# Patient Record
Sex: MALE | Race: WHITE | NOT HISPANIC OR LATINO
[De-identification: names, ages, dates, MRNs, and addresses within clinical notes are randomized per-mention and may not be internally consistent; named-entity substitution may affect disease eponyms.]

---

## 2017-02-04 NOTE — Clinical Note
[facility] Just Milo Berman is off steroids and seems to be doing well. Will monitor him off for now and hopefully he doesn't have any recurrence.  Take care,  Vanessa Steel, DO  [facility]  11/10/2021
immune

## 2018-02-14 ENCOUNTER — HOSPITAL (OUTPATIENT)
Dept: OTHER | Age: 53
End: 2018-02-14
Attending: FAMILY MEDICINE

## 2020-07-07 ENCOUNTER — OFFICE VISIT (OUTPATIENT)
Dept: RHEUMATOLOGY | Facility: CLINIC | Age: 55
End: 2020-07-07
Payer: COMMERCIAL

## 2020-07-07 ENCOUNTER — HOSPITAL ENCOUNTER (OUTPATIENT)
Dept: GENERAL RADIOLOGY | Facility: HOSPITAL | Age: 55
Discharge: HOME | End: 2020-07-07
Attending: INTERNAL MEDICINE
Payer: COMMERCIAL

## 2020-07-07 ENCOUNTER — LAB ENCOUNTER (OUTPATIENT)
Dept: LAB | Facility: HOSPITAL | Age: 55
End: 2020-07-07
Attending: INTERNAL MEDICINE
Payer: COMMERCIAL

## 2020-07-07 VITALS
BODY MASS INDEX: 26.18 KG/M2 | SYSTOLIC BLOOD PRESSURE: 114 MMHG | RESPIRATION RATE: 18 BRPM | TEMPERATURE: 98 F | HEIGHT: 71 IN | DIASTOLIC BLOOD PRESSURE: 78 MMHG | HEART RATE: 78 BPM | WEIGHT: 187 LBS

## 2020-07-07 DIAGNOSIS — M53.3 SI (SACROILIAC) PAIN: ICD-10-CM

## 2020-07-07 DIAGNOSIS — G89.29 CHRONIC LEFT SHOULDER PAIN: ICD-10-CM

## 2020-07-07 DIAGNOSIS — R79.82 ELEVATED C-REACTIVE PROTEIN (CRP): ICD-10-CM

## 2020-07-07 DIAGNOSIS — M25.512 CHRONIC LEFT SHOULDER PAIN: ICD-10-CM

## 2020-07-07 DIAGNOSIS — M19.90 INFLAMMATORY ARTHRITIS: Primary | ICD-10-CM

## 2020-07-07 DIAGNOSIS — M79.10 MYALGIA: ICD-10-CM

## 2020-07-07 DIAGNOSIS — M25.551 BILATERAL HIP PAIN: ICD-10-CM

## 2020-07-07 DIAGNOSIS — M19.90 INFLAMMATORY ARTHRITIS: ICD-10-CM

## 2020-07-07 DIAGNOSIS — M25.552 BILATERAL HIP PAIN: ICD-10-CM

## 2020-07-07 LAB
CRP SERPL-MCNC: 1.64 MG/DL (ref ?–0.3)
SED RATE-ML: 6 MM/HR (ref 0–12)
URATE SERPL-MCNC: 6 MG/DL (ref 3.5–7.2)

## 2020-07-07 PROCEDURE — 72202 X-RAY EXAM SI JOINTS 3/> VWS: CPT | Performed by: INTERNAL MEDICINE

## 2020-07-07 PROCEDURE — 36415 COLL VENOUS BLD VENIPUNCTURE: CPT

## 2020-07-07 PROCEDURE — 86334 IMMUNOFIX E-PHORESIS SERUM: CPT

## 2020-07-07 PROCEDURE — 86235 NUCLEAR ANTIGEN ANTIBODY: CPT

## 2020-07-07 PROCEDURE — 86140 C-REACTIVE PROTEIN: CPT

## 2020-07-07 PROCEDURE — 83516 IMMUNOASSAY NONANTIBODY: CPT

## 2020-07-07 PROCEDURE — 84165 PROTEIN E-PHORESIS SERUM: CPT

## 2020-07-07 PROCEDURE — 83883 ASSAY NEPHELOMETRY NOT SPEC: CPT

## 2020-07-07 PROCEDURE — 85652 RBC SED RATE AUTOMATED: CPT

## 2020-07-07 PROCEDURE — 84550 ASSAY OF BLOOD/URIC ACID: CPT

## 2020-07-07 PROCEDURE — 99204 OFFICE O/P NEW MOD 45 MIN: CPT | Performed by: INTERNAL MEDICINE

## 2020-07-07 NOTE — PROGRESS NOTES
?  RHEUMATOLOGY NEW PATIENT   Date of visit: 7/7/2020  ? Patient presents with:  Establish Care: Here for second opinion, saw Dr. Mariann Ochoa in May, started with hip pain and stiffness in March.  Had bllod work that [employer], some imrovement in pain on patient, >50% of that time spent discussing potential etiology to symptoms, treatment options and coordinating care.       ?  Plan:  Diagnoses and all orders for this visit:    Inflammatory arthritis  -     URIC ACID, SERUM; Future  -     SED RATE, Ivan Phelps dose pack. States within the first 3 days, felt like he was back to his normal. When on the last tablet, felt like the stiffness was returning. Had MRI of hip and lumbar spine.    Followed back with orthopedics and repeat blood test and xrays of hands wer photosensitive rash, Raynaud's phenomenon, prior hematologic abnormality, prior renal or liver disease, or history of seizures. Denies hx of pericarditis or pleuritis. No history of prior blood clot in the legs or lungs, strokes or ischemic phenomenon. History:  Social History    Tobacco Use      Smoking status: Never Smoker      Smokeless tobacco: Never Used    Alcohol use:  Yes      Alcohol/week: 15.0 standard drinks      Types: 15 Glasses of wine per week    Drug use: No    Medications:  Cetirizine HCl lb (84.8 kg)   Height: 71\" Body mass index is 26.08 kg/m². Body surface area is 2.05 meters squared. Pain Score: 4 - (Moderate)       Physical Exam   Constitutional: He is oriented to person, place, and time. He appears well-developed and well-nourished. Pain in right wrist  TECHNIQUE: 8 views. COMPARISON STUDY:  None. FINDINGS:   BONES:No acute osseous abnormality . Diffuse osteopenia. JOINTS:Normal alignment. No joint space narrowing. No erosions. No osteophyte formation.   SOFT TISSUE:No soft tissue s is not clearly delineated on the current  examination.  However, a small cystic structure is seen superior to the anterior and superior  superior aspect of the labrum which is homogeneously high in T2 and low on T1-weighted images  measuring 0.6 x 0.4 x 0.3 05.22.2020     MRI SPINE LUMBAR (CPT=72148)  CLINICAL INDICATION: Spondylosis, unspecified  TECHNIQUE: Multiplanar and multisequence MR imaging was performed through the lumbar spine using the  standard MR protocol.   COMPARISON: MRI of the lumbar spine manuel 04/15/2020    GFR 80 08/03/2016    CA 9.3 04/15/2020    ALKPHO 74 04/15/2020    AST 27 04/15/2020    ALT 56 04/15/2020    BILT 0.55 04/15/2020    TP 8.0 04/15/2020    ALB 4.3 04/15/2020    AGRATIO 2.0 12/19/2015     04/15/2020    K 4.40 04/15/2020

## 2020-07-08 ENCOUNTER — TELEPHONE (OUTPATIENT)
Dept: RHEUMATOLOGY | Facility: CLINIC | Age: 55
End: 2020-07-08

## 2020-07-08 DIAGNOSIS — R79.82 ELEVATED C-REACTIVE PROTEIN (CRP): ICD-10-CM

## 2020-07-08 DIAGNOSIS — M35.3 PMR (POLYMYALGIA RHEUMATICA) (HCC): Primary | ICD-10-CM

## 2020-07-08 LAB
KAPPA FREE LIGHT CHAIN: 1.52 MG/DL (ref 0.33–1.94)
KAPPA/LAMBDA FLC RATIO: 1.13 (ref 0.26–1.65)
LAMBDA FREE LIGHT CHAIN: 1.35 MG/DL (ref 0.57–2.63)

## 2020-07-08 RX ORDER — PREDNISONE 1 MG/1
TABLET ORAL
Qty: 90 TABLET | Refills: 0 | Status: SHIPPED | OUTPATIENT
Start: 2020-07-08 | End: 2020-08-27

## 2020-07-08 NOTE — TELEPHONE ENCOUNTER
Called pt and discussed elevated CRP. Symptoms could be related to PMR. Will move forward with prolonged steroid taper as discussed at visit yesterday. Has plans for colonoscopy next week so will start prednisone thereafter.   Labs in another one month and

## 2020-07-13 LAB
ALBUMIN SERPL ELPH-MCNC: 4.7 G/DL (ref 3.75–5.21)
ALBUMIN/GLOB SERPL: 1.56 {RATIO} (ref 1–2)
ALPHA1 GLOB SERPL ELPH-MCNC: 0.36 G/DL (ref 0.19–0.46)
ALPHA2 GLOB SERPL ELPH-MCNC: 0.71 G/DL (ref 0.48–1.05)
B-GLOBULIN SERPL ELPH-MCNC: 0.82 G/DL (ref 0.68–1.23)
GAMMA GLOB SERPL ELPH-MCNC: 1.12 G/DL (ref 0.62–1.7)
M PROTEIN MFR SERPL ELPH: 7.7 G/DL (ref 6.4–8.2)

## 2020-07-14 ENCOUNTER — TELEPHONE (OUTPATIENT)
Dept: RHEUMATOLOGY | Facility: CLINIC | Age: 55
End: 2020-07-14

## 2020-07-21 LAB
EJ (GLYCYL - TRNA SYNTHETASE) ANTIBODY: NEGATIVE
FIBRILLARIN (U3 RNP) AB, IGG: NEGATIVE
JO-1 (HISTIDY1-TRNA SYNTHETASE) AB, IGG: 0 AU/ML
KU ANTIBODY: NEGATIVE
MDA5 (CADM-140) ANTIBODY: NEGATIVE
MI-2 (NUCLEAR HELICASE PROTEIN) ANTIBODY: NEGATIVE
NXP-2 (NUCLEAR MATRIX PROTEIN-2) AB: NEGATIVE
OJ (ISOLEUCYL-TRNA SYNTHETASE) ANTIBODY: NEGATIVE
P155/140 (TIF-1 GAMMA) ANTIBODY: NEGATIVE
PL-12 (ALANYL-TRNA SYNTHETASE) ANTIBODY: NEGATIVE
PL-7 (THREONYL-TRNA SYNTHETASE) ANTIBODY: NEGATIVE
PM_SCL 100 ANTIBODY, IGG: NEGATIVE
RIBONUCLEIC PROTEIN (U1) (ENA) AB, IGG: 10 AU/ML
SAE1 (SUMO ACTIVATING ENZYME) ANTIBODY: NEGATIVE
SRP (SINGLE RECOGNITION PARTICLE) AB: NEGATIVE
SSA 52 (RO) (ENA) ANTIBODY, IGG: 2 AU/ML
SSA 60 (RO) (ENA) ANTIBODY, IGG: 1 AU/ML
TIF1-GAMMA ANTIBODY: NEGATIVE

## 2020-08-26 ENCOUNTER — PATIENT MESSAGE (OUTPATIENT)
Dept: RHEUMATOLOGY | Facility: CLINIC | Age: 55
End: 2020-08-26

## 2020-08-26 DIAGNOSIS — M35.3 PMR (POLYMYALGIA RHEUMATICA) (HCC): ICD-10-CM

## 2020-08-26 DIAGNOSIS — R79.82 ELEVATED C-REACTIVE PROTEIN (CRP): ICD-10-CM

## 2020-08-27 RX ORDER — PREDNISONE 2.5 MG
2.5 TABLET ORAL DAILY
Qty: 30 TABLET | Refills: 0 | Status: SHIPPED | OUTPATIENT
Start: 2020-08-27 | End: 2020-09-14

## 2020-08-27 NOTE — TELEPHONE ENCOUNTER
From: Frank Woodson  To: Mack Jones DO  Sent: 8/26/2020 10:36 PM CDT  Subject: Prescription Yanick Lat Dr. Eli Castillo, I will run out or prednisone on Friday. I am feeling pretty good, only a hint of stiffness now and then.  Should I get a refill and

## 2020-09-14 RX ORDER — PREDNISONE 1 MG/1
2 TABLET ORAL 2 TIMES DAILY
Qty: 360 TABLET | Refills: 0 | Status: SHIPPED | OUTPATIENT
Start: 2020-09-14 | End: 2020-11-23

## 2020-10-01 ENCOUNTER — PATIENT MESSAGE (OUTPATIENT)
Dept: RHEUMATOLOGY | Facility: CLINIC | Age: 55
End: 2020-10-01

## 2020-10-01 DIAGNOSIS — M19.90 INFLAMMATORY ARTHRITIS: ICD-10-CM

## 2020-10-01 DIAGNOSIS — M35.3 PMR (POLYMYALGIA RHEUMATICA) (HCC): Primary | ICD-10-CM

## 2020-10-01 DIAGNOSIS — R79.82 ELEVATED C-REACTIVE PROTEIN (CRP): ICD-10-CM

## 2020-10-06 ENCOUNTER — OFFICE VISIT (OUTPATIENT)
Dept: RHEUMATOLOGY | Facility: CLINIC | Age: 55
End: 2020-10-06
Payer: COMMERCIAL

## 2020-10-06 VITALS
HEART RATE: 78 BPM | WEIGHT: 186 LBS | SYSTOLIC BLOOD PRESSURE: 136 MMHG | TEMPERATURE: 97 F | RESPIRATION RATE: 16 BRPM | HEIGHT: 71 IN | BODY MASS INDEX: 26.04 KG/M2 | DIASTOLIC BLOOD PRESSURE: 84 MMHG

## 2020-10-06 DIAGNOSIS — M25.551 BILATERAL HIP PAIN: ICD-10-CM

## 2020-10-06 DIAGNOSIS — M25.552 BILATERAL HIP PAIN: ICD-10-CM

## 2020-10-06 DIAGNOSIS — R79.82 ELEVATED C-REACTIVE PROTEIN (CRP): ICD-10-CM

## 2020-10-06 DIAGNOSIS — Z79.52 CURRENT CHRONIC USE OF SYSTEMIC STEROIDS: ICD-10-CM

## 2020-10-06 DIAGNOSIS — M35.3 PMR (POLYMYALGIA RHEUMATICA) (HCC): Primary | ICD-10-CM

## 2020-10-06 DIAGNOSIS — M19.90 INFLAMMATORY ARTHRITIS: ICD-10-CM

## 2020-10-06 PROCEDURE — 3075F SYST BP GE 130 - 139MM HG: CPT | Performed by: INTERNAL MEDICINE

## 2020-10-06 PROCEDURE — 3079F DIAST BP 80-89 MM HG: CPT | Performed by: INTERNAL MEDICINE

## 2020-10-06 PROCEDURE — 99214 OFFICE O/P EST MOD 30 MIN: CPT | Performed by: INTERNAL MEDICINE

## 2020-10-06 PROCEDURE — 3008F BODY MASS INDEX DOCD: CPT | Performed by: INTERNAL MEDICINE

## 2020-10-06 NOTE — PROGRESS NOTES
?  RHEUMATOLOGY FOLLOW UP   Date of visit: 10/06/2020  ? Patient presents with: Follow - Up: est pt, Taking 4mg prednisone daily, but having feelings of stiffness to joints. Pain to neck, hips, knees and wrists. Walks 5miles every day.      ?  ASSESSMEN above instructions. No further questions at this time. Spent 25 min face to face with patient, >50% of that time spent discussing potential etiology to symptoms, treatment options and coordinating care.       ?  Plan:  Diagnoses and all orders for this vis discomfort on the left. Felt this worse more at night. His daughter is  PT and did some exercises for bursitis as well as for the shoulder with some improvement. Then developed some low neck pain as well as low back pain, not severe.   Was evaluated by Shelby Martínez painful to the touch. States popped the lesions himself with \"blood and water\" came out. Has since tried to adjust his diet and eating , takes tumeric, fish oil, calcium and vitamin d, glucosamine supplementations.      Denies any infection or f CARDIAC CALCIUM SCORE  2004    UFCT zero   • CARDIAC CALCIUM SCORE  2018    79   • COLONOSCOPY  7/14/20= Diverticulosis    Repeat 2025   • COLONOSCOPY & POLYPECTOMY  1/30/15= Polyp (TA)    Repeat 2020   • COLONOSCOPY, POSSIBLE BIOPSY, POSSIBLE POLYPECTOMY nausea and vomiting. Genitourinary: Negative for dysuria, frequency and urgency. Musculoskeletal: Positive for back pain, joint pain and myalgias. Negative for neck pain. Skin: Negative for rash.    Neurological: Negative for dizziness, seizures, weak with full ROM. Bilateral knees without medial joint line tenderness, no crepitus, no effusion. + noted momentary discomfort when first getting up from seated position   Lymphadenopathy:     He has no cervical adenopathy.    Neurological: He is alert and o images and thin cut proton density fat-saturated images  through the right hip only were also utilized. Contrast was not injected. Imaging was performed on a  1.5 [place] magnet.   FINDINGS:  OSSEOUS STRUCTURES:   The visualized osseous structures of the bony areas are appreciated within the peritoneal cavity and there is no  free fluid. There is no inguinal adenopathy or gross ventral wall defect and the subcutaneous  tissues are within normal limits.  No additional focal soft tissue abnormalities are seen asso obvious fractures or destructive lesions seen. COMPARISON STUDIES:  Old imported MRI 1/22/2013  =====  IMPRESSION:    Degenerative disc disease as detailed above.     Labs:  Lab Results   Component Value Date    WBC 6.05 04/15/2020    RBC 5.12 04/15/2020

## 2020-10-06 NOTE — PATIENT INSTRUCTIONS
-- okay to increase prednisone to 5mg daily for next month (take full dose in am)  -- continue calcium/vitamin d supplementation   -- repeat labs in one month   -- okay to get both flu and shingles vaccines  -- follow up in 4 months or sooner as needed  --

## 2020-11-20 ENCOUNTER — PATIENT MESSAGE (OUTPATIENT)
Dept: RHEUMATOLOGY | Facility: CLINIC | Age: 55
End: 2020-11-20

## 2020-11-20 DIAGNOSIS — R79.82 ELEVATED C-REACTIVE PROTEIN (CRP): ICD-10-CM

## 2020-11-20 DIAGNOSIS — M35.3 PMR (POLYMYALGIA RHEUMATICA) (HCC): ICD-10-CM

## 2020-11-23 RX ORDER — PREDNISONE 1 MG/1
4 TABLET ORAL
Qty: 360 TABLET | Refills: 0 | Status: SHIPPED | OUTPATIENT
Start: 2020-11-23 | End: 2021-02-21

## 2021-02-03 ENCOUNTER — OFFICE VISIT (OUTPATIENT)
Dept: RHEUMATOLOGY | Facility: CLINIC | Age: 56
End: 2021-02-03
Payer: COMMERCIAL

## 2021-02-03 VITALS
SYSTOLIC BLOOD PRESSURE: 124 MMHG | TEMPERATURE: 97 F | RESPIRATION RATE: 16 BRPM | BODY MASS INDEX: 26.6 KG/M2 | HEART RATE: 78 BPM | HEIGHT: 71 IN | DIASTOLIC BLOOD PRESSURE: 76 MMHG | WEIGHT: 190 LBS

## 2021-02-03 DIAGNOSIS — M25.532 PAIN IN BOTH WRISTS: ICD-10-CM

## 2021-02-03 DIAGNOSIS — M79.642 BILATERAL HAND PAIN: ICD-10-CM

## 2021-02-03 DIAGNOSIS — M35.3 PMR (POLYMYALGIA RHEUMATICA) (HCC): Primary | ICD-10-CM

## 2021-02-03 DIAGNOSIS — M25.531 PAIN IN BOTH WRISTS: ICD-10-CM

## 2021-02-03 DIAGNOSIS — M79.641 BILATERAL HAND PAIN: ICD-10-CM

## 2021-02-03 DIAGNOSIS — Z79.52 CURRENT CHRONIC USE OF SYSTEMIC STEROIDS: ICD-10-CM

## 2021-02-03 PROCEDURE — 3078F DIAST BP <80 MM HG: CPT | Performed by: INTERNAL MEDICINE

## 2021-02-03 PROCEDURE — 99214 OFFICE O/P EST MOD 30 MIN: CPT | Performed by: INTERNAL MEDICINE

## 2021-02-03 PROCEDURE — 3008F BODY MASS INDEX DOCD: CPT | Performed by: INTERNAL MEDICINE

## 2021-02-03 PROCEDURE — 3074F SYST BP LT 130 MM HG: CPT | Performed by: INTERNAL MEDICINE

## 2021-02-03 NOTE — PROGRESS NOTES
RHEUMATOLOGY FOLLOW UP   Date of visit: 2/3/2021  ? Patient presents with:  PMR: 4 month f/u. Feeling pretty good. On 3mg of prednisone. Body readjust with each reduction for about a week, but then feels fine. Goal is to get off prednisone completely. decrease  -- I will print labs for the next several months for you  -- okay to follow up in 6 months   -- call/message with any questions/concerns in the meantime    Patient verbalized understanding of above instructions. No further questions at this time. overt side effects to the prednisone. Was able to get flu shot in Oct then got the first shingles vaccine. HPI from initial consultation  referred for rheumatologic evaluation for second opinion regarding joint pain.      States he developed generaliz opening bottles.  Feels swelling in the mornings of the fingers diffusely and ulnar aspect of the right wrist.   + increased knee discomfort after periods of rest.   + hip discomfort still present but not as bad as early May when it was at it's worst.   + b weight loss, easy bruising or bleeding. Denies epistaxis. Denies chronic cough or hemoptysis. Denies obvious blood in urine. Family hx:   Mother and maternal grandfather with osteoarthritis   No known hx of AS, PSA or RA.      Past Medical History: SYSTEMS   ? Review of Systems   Constitutional: Negative for chills, fever, malaise/fatigue and weight loss. HENT: Negative for hearing loss, sinus pain and tinnitus. Eyes: Negative for blurred vision, double vision and pain.    Respiratory: Negative the fingers, no basilar joint tenderness of the 1st CMC bilaterally. - stable  No swelling, tenderness, redness or restriction of motion of the DIPs, PIPs, MCPs, elbows, ankles, or joints of the feet.   Tenderness along ulnar right wrist and restriction in arthropathy. [facility] DATE OF SERVICE: 05.22.2020     MRI HIPS, RIGHT (CPT=73721)  CLINICAL INDICATION: Right lower quadrant pain. COMPARISON STUDY: May 4, 2020 radiograph.   TECHNIQUE: T1 and inversion recovery sequences through the bony pelvis were performed all imaging sequences. There is no evidence of  infiltrative signal abnormality or focal fluid collections to suggest strain, tendinosis or  tenosynovitis. No discrete focal solid or cystic myotendinous lesions are appreciated.   REMAINING SOFT TISSUES AND pain   FINDINGS:     AP, Lateral with flexion/extension views of lumbar spine completed. 5 lumbar vertebral bodies seen. Degenerative disc disease is seen with disc space loss and sclerotic endplate changes and facet  joint hypertrophy noted at L5-S1.

## 2021-02-03 NOTE — PATIENT INSTRUCTIONS
-- decrease prednisone to 2mg daily for the next month  -- repeat labs at the beginning of March, and depending on how you're feeling plus labs, will consider further decrease  -- I will print labs for the next several months for you  -- okay to follow up

## 2021-03-25 ENCOUNTER — PATIENT MESSAGE (OUTPATIENT)
Dept: RHEUMATOLOGY | Facility: CLINIC | Age: 56
End: 2021-03-25

## 2021-03-25 DIAGNOSIS — M35.3 PMR (POLYMYALGIA RHEUMATICA) (HCC): Primary | ICD-10-CM

## 2021-03-26 RX ORDER — PREDNISONE 1 MG/1
2 TABLET ORAL DAILY
Qty: 180 TABLET | Refills: 0 | Status: SHIPPED | OUTPATIENT
Start: 2021-03-26 | End: 2021-04-19 | Stop reason: ALTCHOICE

## 2021-04-21 ENCOUNTER — TELEPHONE (OUTPATIENT)
Dept: RHEUMATOLOGY | Facility: CLINIC | Age: 56
End: 2021-04-21

## 2021-04-21 NOTE — TELEPHONE ENCOUNTER
[facility] test results 4/14/2021 sed rate 2 C-reactive protein 1 both tests are normal.  Normal for [facility] is CRP less than 10. Patient being treated by Dr. Zulema Lenz for PMR. Patient left on answering machine.   Maintain 2 mg/day of prednisone, unless feelin

## 2021-08-11 ENCOUNTER — OFFICE VISIT (OUTPATIENT)
Dept: RHEUMATOLOGY | Facility: CLINIC | Age: 56
End: 2021-08-11
Payer: COMMERCIAL

## 2021-08-11 VITALS
DIASTOLIC BLOOD PRESSURE: 70 MMHG | RESPIRATION RATE: 16 BRPM | HEART RATE: 60 BPM | WEIGHT: 184 LBS | SYSTOLIC BLOOD PRESSURE: 116 MMHG | HEIGHT: 71 IN | TEMPERATURE: 98 F | BODY MASS INDEX: 25.76 KG/M2

## 2021-08-11 DIAGNOSIS — Z87.898 HISTORY OF MULTIPLE PULMONARY NODULES: ICD-10-CM

## 2021-08-11 DIAGNOSIS — R07.9 CHEST PAIN, UNSPECIFIED TYPE: ICD-10-CM

## 2021-08-11 DIAGNOSIS — M35.3 PMR (POLYMYALGIA RHEUMATICA) (HCC): Primary | ICD-10-CM

## 2021-08-11 DIAGNOSIS — Z92.241 HISTORY OF STEROID THERAPY: ICD-10-CM

## 2021-08-11 PROCEDURE — 3078F DIAST BP <80 MM HG: CPT | Performed by: INTERNAL MEDICINE

## 2021-08-11 PROCEDURE — 3008F BODY MASS INDEX DOCD: CPT | Performed by: INTERNAL MEDICINE

## 2021-08-11 PROCEDURE — 99214 OFFICE O/P EST MOD 30 MIN: CPT | Performed by: INTERNAL MEDICINE

## 2021-08-11 PROCEDURE — 3074F SYST BP LT 130 MM HG: CPT | Performed by: INTERNAL MEDICINE

## 2021-08-11 RX ORDER — ASPIRIN 81 MG/1
81 TABLET ORAL DAILY
COMMUNITY

## 2021-08-11 NOTE — PROGRESS NOTES
RHEUMATOLOGY FOLLOW UP   Date of visit: 08/11/2021  ? Patient presents with:  PMR: 6 month f/u. Feeling really good. Decreased prednisone from 2mg to 1mg two weeks ago. Had a heart attack june 12, but recovering ok.      ?  ASSESSMENT, DISCUSSION & PLAN time.    Code selection for this visit was based on time spent (30-39min) on date of service in preparing to see the patient, obtaining and/or reviewing separately obtained history, performing a medically appropriate examination, counseling and educating t for second opinion regarding joint pain. States he developed generalized hip pain, right worse than left in March. Felt discomfort on the lateral hip but all over the anterior hip. States had difficult lifting up the legs.  Has worsened symptoms in the still present but not as bad as early May when it was at it's worst.   + bilateral shoulder discomfort more in the middle of night and did have some discomfort when driving. Denies symptoms in the ankles or feet.  Did have some swelling after seated for p osteoarthritis   No known hx of AS, PSA or RA.      Past Medical History:  Past Medical History:   Diagnosis Date   • Achilles tendon rupture    • ALLERGIC RHINITIS    • CAD (coronary artery disease) 2021    50%  LAD   [facility] ADA, no stent   • Colon PRN for allergies, Disp: , Rfl:       Modified Medications    No medications on file     There are no discontinued medications. ?  ?  Allergies:  No Known Allergies  ? REVIEW OF SYSTEMS   ?   Review of Systems   Constitutional: Negative for chills, fever, regular rhythm. Heart sounds: Normal heart sounds. No murmur heard. Pulmonary:      Effort: Pulmonary effort is normal. No respiratory distress. Breath sounds: Normal breath sounds. No wheezing.    Musculoskeletal:         General: No tenderne VIEWS), RIGHT (CPT=73130)  CLINICAL INFORMATION:  Pain in right wrist  TECHNIQUE: 8 views. COMPARISON STUDY:  None. FINDINGS:   BONES:No acute osseous abnormality . Diffuse osteopenia. JOINTS:Normal alignment. No joint space narrowing. No erosions.  No o STRUCTURES:  Some minimal intermediate increased signal intensity is seen associated with the gluteus medius  myotendinous junction bilaterally just proximal to the greater trochanter seen best on the right on  image 17 of series 3 and on the left on image tissues:1.6 cm incompletely imaged cyst at the interpolar left kidney. L1-L2: Normal  L2-L3: Normal  L3-L4: Normal  L4-L5: Normal  L5-S1: Stable right L5 pars defect. Patent neural foramina.  No central canal stenosis.  =====  IMPRESSION:   Stable right L5 2.0 (N<10)    07/2020  CRP 1.64 (N<0.3)  ESR 6  Uric acid 6.0  SPEP grossly normal  Myositis antibody panel negative    05/2020  CCP negative  ESR 7 normal   CRP 1.51 elevated  ROBERTO negative  RF negative    04/2020  CK 88 normal   ESR 11 normal   CRP 1.10 e

## 2021-08-17 ENCOUNTER — TELEPHONE (OUTPATIENT)
Dept: RHEUMATOLOGY | Facility: CLINIC | Age: 56
End: 2021-08-17

## 2021-08-17 NOTE — TELEPHONE ENCOUNTER
Phoned [place] to initiate a PA for CT chest   No PA required per health plan    Phoned pt, LVM for pt to call central scheduling to schedule that appt.

## 2021-11-09 ENCOUNTER — OFFICE VISIT (OUTPATIENT)
Dept: RHEUMATOLOGY | Facility: CLINIC | Age: 56
End: 2021-11-09
Payer: COMMERCIAL

## 2021-11-09 VITALS
HEIGHT: 71 IN | WEIGHT: 184 LBS | SYSTOLIC BLOOD PRESSURE: 104 MMHG | BODY MASS INDEX: 25.76 KG/M2 | DIASTOLIC BLOOD PRESSURE: 60 MMHG | OXYGEN SATURATION: 95 % | RESPIRATION RATE: 16 BRPM | HEART RATE: 60 BPM | TEMPERATURE: 97 F

## 2021-11-09 DIAGNOSIS — M79.642 BILATERAL HAND PAIN: ICD-10-CM

## 2021-11-09 DIAGNOSIS — M35.3 PMR (POLYMYALGIA RHEUMATICA) (HCC): Primary | ICD-10-CM

## 2021-11-09 DIAGNOSIS — M79.641 BILATERAL HAND PAIN: ICD-10-CM

## 2021-11-09 DIAGNOSIS — Z92.241 HISTORY OF STEROID THERAPY: ICD-10-CM

## 2021-11-09 PROCEDURE — 99214 OFFICE O/P EST MOD 30 MIN: CPT | Performed by: INTERNAL MEDICINE

## 2021-11-09 PROCEDURE — 3078F DIAST BP <80 MM HG: CPT | Performed by: INTERNAL MEDICINE

## 2021-11-09 PROCEDURE — 3074F SYST BP LT 130 MM HG: CPT | Performed by: INTERNAL MEDICINE

## 2021-11-09 PROCEDURE — 3008F BODY MASS INDEX DOCD: CPT | Performed by: INTERNAL MEDICINE

## 2021-11-09 NOTE — PROGRESS NOTES
RHEUMATOLOGY FOLLOW UP   Date of visit: 11/09/2021  ? Patient presents with:  PMR: 3 month f/u. Feeling really well for the most part. Occationally will get wake up with stiffness in fingers. Discontinued prednisone several months ago.      ?  ASSESSMENT steroids    He has plans to move to [place] in the next year or so and I recommended he establish care with PCP and cardiology once moving as well as have a rheumatologist in mind, in case disease flares. He call follow up with me as needed at this point. rheumatologic evaluation for second opinion regarding joint pain. States he developed generalized hip pain, right worse than left in March. Felt discomfort on the lateral hip but all over the anterior hip. States had difficult lifting up the legs.  Has rest.   + hip discomfort still present but not as bad as early May when it was at it's worst.   + bilateral shoulder discomfort more in the middle of night and did have some discomfort when driving. Denies symptoms in the ankles or feet.  Did have some sw maternal grandfather with osteoarthritis   No known hx of AS, PSA or RA.      Past Medical History:  Past Medical History:   Diagnosis Date   • Achilles tendon rupture    • ALLERGIC RHINITIS    • CAD (coronary artery disease) 2021    50%  LAD   [facility] Oral Tab, 1 TABLET DAILY PRN for allergies, Disp: , Rfl:       Modified Medications    No medications on file     There are no discontinued medications. ?  ?  Allergies:  No Known Allergies  ? REVIEW OF SYSTEMS   ?   Review of Systems   Constitutional: Ne deviation. Cardiovascular:      Rate and Rhythm: Normal rate and regular rhythm. Heart sounds: Normal heart sounds. No murmur heard. Pulmonary:      Effort: Pulmonary effort is normal. No respiratory distress.       Breath sounds: Normal breath (CPT=73100), XR WRIST (2 VIEWS),  LEFT (CPT=73100), XR HAND (MIN 3 VIEWS), RIGHT (CPT=73130)  CLINICAL INFORMATION:  Pain in right wrist  TECHNIQUE: 8 views. COMPARISON STUDY:  None. FINDINGS:   BONES:No acute osseous abnormality . Diffuse osteopenia.   J erosion or significant osteoarthritis.   MYOTENDINOUS STRUCTURES:  Some minimal intermediate increased signal intensity is seen associated with the gluteus medius  myotendinous junction bilaterally just proximal to the greater trochanter seen best on the ri bone marrow signal is normal.  Paravertebral soft tissues:1.6 cm incompletely imaged cyst at the interpolar left kidney. L1-L2: Normal  L2-L3: Normal  L3-L4: Normal  L4-L5: Normal  L5-S1: Stable right L5 pars defect. Patent neural foramina.  No central can 10/2020  ESR 5  CRP 1.0 (N<10)    08/2020  ESR 3  CRP 2.0 (N<10)    07/2020  CRP 1.64 (N<0.3)  ESR 6  Uric acid 6.0  SPEP grossly normal  Myositis antibody panel negative    05/2020  CCP negative  ESR 7 normal   CRP 1.51 elevated  ROBERTO negative  RF nega

## (undated) NOTE — Clinical Note
Please confirm insurance approval for CTA chest and let pt know when able to schedule. Thanks.    Christine Sarah DO  [facility]  8/12/2021

## (undated) NOTE — Clinical Note
Hi, Dr. Jf White. I saw . Rubia Zavala for rheumatologic evaluation. Please see the discussion portion of my note and let me know if you have any questions.  [facility]7/7/2020